# Patient Record
Sex: MALE | Race: AMERICAN INDIAN OR ALASKA NATIVE
[De-identification: names, ages, dates, MRNs, and addresses within clinical notes are randomized per-mention and may not be internally consistent; named-entity substitution may affect disease eponyms.]

---

## 2020-02-21 ENCOUNTER — HOSPITAL ENCOUNTER (EMERGENCY)
Dept: HOSPITAL 43 - DL.ED | Age: 25
LOS: 1 days | Discharge: HOME | End: 2020-02-22
Payer: COMMERCIAL

## 2020-02-21 VITALS — HEART RATE: 101 BPM | SYSTOLIC BLOOD PRESSURE: 146 MMHG | DIASTOLIC BLOOD PRESSURE: 70 MMHG

## 2020-02-21 DIAGNOSIS — Z88.0: ICD-10-CM
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
## 2020-02-21 DIAGNOSIS — S61.432A: Primary | ICD-10-CM

## 2020-02-21 DIAGNOSIS — F17.210: ICD-10-CM

## 2020-02-21 PROCEDURE — 12001 RPR S/N/AX/GEN/TRNK 2.5CM/<: CPT

## 2020-02-21 PROCEDURE — 99283 EMERGENCY DEPT VISIT LOW MDM: CPT
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
